# Patient Record
Sex: MALE | Race: WHITE | NOT HISPANIC OR LATINO | URBAN - METROPOLITAN AREA
[De-identification: names, ages, dates, MRNs, and addresses within clinical notes are randomized per-mention and may not be internally consistent; named-entity substitution may affect disease eponyms.]

---

## 2019-06-13 ENCOUNTER — APPOINTMENT (OUTPATIENT)
Dept: URBAN - METROPOLITAN AREA CLINIC 189 | Age: 49
Setting detail: DERMATOLOGY
End: 2019-06-13

## 2019-06-13 DIAGNOSIS — L40.0 PSORIASIS VULGARIS: ICD-10-CM

## 2019-06-13 PROCEDURE — OTHER COUNSELING: OTHER

## 2019-06-13 PROCEDURE — OTHER PRESCRIPTION: OTHER

## 2019-06-13 PROCEDURE — OTHER TREATMENT REGIMEN: OTHER

## 2019-06-13 PROCEDURE — OTHER MIPS QUALITY: OTHER

## 2019-06-13 PROCEDURE — 99213 OFFICE O/P EST LOW 20 MIN: CPT

## 2019-06-13 RX ORDER — HALOBETASOL PROPIONATE 0.1 MG/G
LOTION TOPICAL
Qty: 1 | Refills: 2 | Status: ERX | COMMUNITY
Start: 2019-06-13

## 2019-06-13 RX ORDER — CLOBETASOL PROPIONATE 0.5 MG/ML
SHAMPOO TOPICAL
Qty: 1 | Refills: 3 | Status: ERX | COMMUNITY
Start: 2019-06-13

## 2019-06-13 ASSESSMENT — LOCATION SIMPLE DESCRIPTION DERM
LOCATION SIMPLE: LEFT SHOULDER
LOCATION SIMPLE: HAIR
LOCATION SIMPLE: LEFT THIGH
LOCATION SIMPLE: LEFT UPPER BACK
LOCATION SIMPLE: RIGHT THIGH
LOCATION SIMPLE: RIGHT SHOULDER

## 2019-06-13 ASSESSMENT — LOCATION DETAILED DESCRIPTION DERM
LOCATION DETAILED: LEFT ANTERIOR DISTAL THIGH
LOCATION DETAILED: LEFT SUPERIOR MEDIAL UPPER BACK
LOCATION DETAILED: RIGHT ANTERIOR DISTAL THIGH
LOCATION DETAILED: LEFT ANTERIOR SHOULDER
LOCATION DETAILED: RIGHT ANTERIOR SHOULDER
LOCATION DETAILED: HAIR

## 2019-06-13 ASSESSMENT — PGA PSORIASIS: PGA PSORIASIS: MODERATE (MODERATE PLAQUE ELEVATION, MODERATE ERYTHEMA, COARSE SCALE PREDOMINATES)

## 2019-06-13 ASSESSMENT — LOCATION ZONE DERM
LOCATION ZONE: TRUNK
LOCATION ZONE: ARM
LOCATION ZONE: LEG
LOCATION ZONE: SCALP

## 2019-06-13 ASSESSMENT — BSA PSORIASIS: % BODY COVERED IN PSORIASIS: 5

## 2019-06-13 NOTE — PROCEDURE: TREATMENT REGIMEN
Other Instructions: May mix Clobetasol shampoo with T-gel shampoo if desired: apply daily in shower, allow to sit for 5 minutes, then rinse off
Action 2: Continue
Detail Level: Zone

## 2019-06-26 RX ORDER — HALOBETASOL PROPIONATE 0.1 MG/G
LOTION TOPICAL
Qty: 1 | Refills: 2 | Status: ERX

## 2019-08-05 ENCOUNTER — APPOINTMENT (OUTPATIENT)
Dept: URBAN - METROPOLITAN AREA CLINIC 189 | Age: 49
Setting detail: DERMATOLOGY
End: 2019-08-05

## 2019-08-05 DIAGNOSIS — L40.0 PSORIASIS VULGARIS: ICD-10-CM

## 2019-08-05 PROCEDURE — 99213 OFFICE O/P EST LOW 20 MIN: CPT

## 2019-08-05 PROCEDURE — OTHER MIPS QUALITY: OTHER

## 2019-08-05 PROCEDURE — OTHER COUNSELING: OTHER

## 2019-08-05 PROCEDURE — OTHER TREATMENT REGIMEN: OTHER

## 2019-08-05 ASSESSMENT — LOCATION DETAILED DESCRIPTION DERM
LOCATION DETAILED: RIGHT ANTERIOR DISTAL THIGH
LOCATION DETAILED: LEFT ANTERIOR DISTAL THIGH
LOCATION DETAILED: LEFT ANTERIOR SHOULDER
LOCATION DETAILED: HAIR
LOCATION DETAILED: RIGHT ANTERIOR SHOULDER

## 2019-08-05 ASSESSMENT — LOCATION SIMPLE DESCRIPTION DERM
LOCATION SIMPLE: HAIR
LOCATION SIMPLE: LEFT THIGH
LOCATION SIMPLE: RIGHT THIGH
LOCATION SIMPLE: RIGHT SHOULDER
LOCATION SIMPLE: LEFT SHOULDER

## 2019-08-05 ASSESSMENT — LOCATION ZONE DERM
LOCATION ZONE: SCALP
LOCATION ZONE: ARM
LOCATION ZONE: LEG

## 2019-08-05 ASSESSMENT — BSA PSORIASIS: % BODY COVERED IN PSORIASIS: 3

## 2019-08-05 ASSESSMENT — PGA PSORIASIS: PGA PSORIASIS: MODERATE (MODERATE PLAQUE ELEVATION, MODERATE ERYTHEMA, COARSE SCALE PREDOMINATES)

## 2019-08-05 NOTE — PROCEDURE: TREATMENT REGIMEN
Continue Regimen: Bryhali as previously directed. Recommend applying to the scalp nightly
Action 1: Continue
Otc Regimen: Recommend mixing equal parts Clobetasol shampoo and TSal and shampoo the scalp daily for 2-3 weeks, then hold for 1 week, then repeat cycle if needed
Detail Level: Zone
Other Instructions: Discussed ILK if patient desires
Samples Given: Senri: apply a small amount to the scalp daily for up to 8 weeks, then hold for 1 week. Repeat cycle if needed

## 2021-04-26 ENCOUNTER — RX ONLY (RX ONLY)
Age: 51
End: 2021-04-26

## 2021-04-26 ENCOUNTER — APPOINTMENT (OUTPATIENT)
Dept: URBAN - METROPOLITAN AREA CLINIC 189 | Age: 51
Setting detail: DERMATOLOGY
End: 2021-04-26

## 2021-04-26 DIAGNOSIS — L40.0 PSORIASIS VULGARIS: ICD-10-CM

## 2021-04-26 PROCEDURE — OTHER MIPS QUALITY: OTHER

## 2021-04-26 PROCEDURE — 99213 OFFICE O/P EST LOW 20 MIN: CPT

## 2021-04-26 PROCEDURE — OTHER TREATMENT REGIMEN: OTHER

## 2021-04-26 PROCEDURE — OTHER COUNSELING: OTHER

## 2021-04-26 RX ORDER — CLOBETASOL PROPIONATE 0.05 G/100ML
SHAMPOO TOPICAL
Qty: 1 | Refills: 3 | Status: ERX

## 2021-04-26 RX ORDER — HALOBETASOL PROPIONATE 0.1 MG/G
LOTION TOPICAL
Qty: 1 | Refills: 2 | Status: ERX

## 2021-04-26 RX ORDER — CLOBETASOL PROPIONATE 0.05 G/100ML
SHAMPOO TOPICAL
Qty: 1 | Refills: 3 | Status: ACTIVE

## 2021-04-26 ASSESSMENT — LOCATION ZONE DERM
LOCATION ZONE: SCALP
LOCATION ZONE: ARM
LOCATION ZONE: LEG

## 2021-04-26 ASSESSMENT — PGA PSORIASIS: PGA PSORIASIS 2020: MILD

## 2021-04-26 ASSESSMENT — LOCATION SIMPLE DESCRIPTION DERM
LOCATION SIMPLE: HAIR
LOCATION SIMPLE: RIGHT SHOULDER
LOCATION SIMPLE: RIGHT THIGH
LOCATION SIMPLE: LEFT SHOULDER
LOCATION SIMPLE: LEFT THIGH

## 2021-04-26 ASSESSMENT — LOCATION DETAILED DESCRIPTION DERM
LOCATION DETAILED: RIGHT ANTERIOR DISTAL THIGH
LOCATION DETAILED: LEFT ANTERIOR SHOULDER
LOCATION DETAILED: HAIR
LOCATION DETAILED: RIGHT ANTERIOR SHOULDER
LOCATION DETAILED: LEFT ANTERIOR DISTAL THIGH

## 2021-04-26 ASSESSMENT — BSA PSORIASIS: % BODY COVERED IN PSORIASIS: 1

## 2021-04-26 NOTE — PROCEDURE: TREATMENT REGIMEN
Detail Level: Zone
Action 4: Continue
Continue Regimen: Bryhali as previously directed. Recommend applying to the scalp nightly. Refilled. Rebate card given to patient.\\nClobetasol shampoo: apply to scalp daily for 2-3 weeks, then hold x 1 week, then repeat cycle. Refilled

## 2023-06-07 ENCOUNTER — RX ONLY (RX ONLY)
Age: 53
End: 2023-06-07

## 2023-06-07 ENCOUNTER — APPOINTMENT (OUTPATIENT)
Dept: URBAN - METROPOLITAN AREA CLINIC 189 | Age: 53
Setting detail: DERMATOLOGY
End: 2023-06-08

## 2023-06-07 DIAGNOSIS — L40.0 PSORIASIS VULGARIS: ICD-10-CM

## 2023-06-07 DIAGNOSIS — L82.1 OTHER SEBORRHEIC KERATOSIS: ICD-10-CM

## 2023-06-07 DIAGNOSIS — D485 NEOPLASM OF UNCERTAIN BEHAVIOR OF SKIN: ICD-10-CM

## 2023-06-07 DIAGNOSIS — L72.0 EPIDERMAL CYST: ICD-10-CM

## 2023-06-07 PROBLEM — D48.5 NEOPLASM OF UNCERTAIN BEHAVIOR OF SKIN: Status: ACTIVE | Noted: 2023-06-07

## 2023-06-07 PROCEDURE — OTHER MIPS QUALITY: OTHER

## 2023-06-07 PROCEDURE — OTHER BIOPSY BY SHAVE METHOD: OTHER

## 2023-06-07 PROCEDURE — 99213 OFFICE O/P EST LOW 20 MIN: CPT | Mod: 25

## 2023-06-07 PROCEDURE — 11102 TANGNTL BX SKIN SINGLE LES: CPT

## 2023-06-07 PROCEDURE — OTHER TREATMENT REGIMEN: OTHER

## 2023-06-07 PROCEDURE — OTHER COUNSELING: OTHER

## 2023-06-07 PROCEDURE — OTHER PRESCRIPTION: OTHER

## 2023-06-07 RX ORDER — CLOBETASOL PROPIONATE 0.05 G/100ML
SHAMPOO TOPICAL
Qty: 118 | Refills: 1 | Status: ERX | COMMUNITY
Start: 2023-06-07

## 2023-06-07 RX ORDER — HALOBETASOL PROPIONATE 0.1 MG/G
LOTION TOPICAL
Qty: 60 | Refills: 1 | Status: ERX | COMMUNITY
Start: 2023-06-07

## 2023-06-07 ASSESSMENT — LOCATION DETAILED DESCRIPTION DERM
LOCATION DETAILED: HAIR
LOCATION DETAILED: RIGHT LATERAL INFERIOR EYELID
LOCATION DETAILED: RIGHT MEDIAL SUPERIOR EYELID
LOCATION DETAILED: RIGHT PROXIMAL PRETIBIAL REGION
LOCATION DETAILED: INFERIOR THORACIC SPINE
LOCATION DETAILED: LEFT PROXIMAL PRETIBIAL REGION
LOCATION DETAILED: LEFT MEDIAL EYEBROW
LOCATION DETAILED: LEFT SUPRATARSAL CREASE
LOCATION DETAILED: LEFT MEDIAL MALAR CHEEK
LOCATION DETAILED: RIGHT SUPERIOR UPPER BACK

## 2023-06-07 ASSESSMENT — LOCATION SIMPLE DESCRIPTION DERM
LOCATION SIMPLE: LEFT PRETIBIAL REGION
LOCATION SIMPLE: RIGHT PRETIBIAL REGION
LOCATION SIMPLE: LEFT EYEBROW
LOCATION SIMPLE: LEFT CHEEK
LOCATION SIMPLE: UPPER BACK
LOCATION SIMPLE: RIGHT SUPERIOR EYELID
LOCATION SIMPLE: RIGHT UPPER BACK
LOCATION SIMPLE: LEFT SUPERIOR EYELID
LOCATION SIMPLE: HAIR
LOCATION SIMPLE: RIGHT INFERIOR EYELID

## 2023-06-07 ASSESSMENT — LOCATION ZONE DERM
LOCATION ZONE: EYELID
LOCATION ZONE: FACE
LOCATION ZONE: LEG
LOCATION ZONE: SCALP
LOCATION ZONE: TRUNK

## 2023-06-07 ASSESSMENT — PGA PSORIASIS: PGA PSORIASIS 2020: MODERATE

## 2023-06-07 ASSESSMENT — BSA PSORIASIS: % BODY COVERED IN PSORIASIS: 4

## 2023-06-07 ASSESSMENT — ITCH NUMERIC RATING SCALE: HOW SEVERE IS YOUR ITCHING?: 3

## 2023-06-07 NOTE — PROCEDURE: BIOPSY BY SHAVE METHOD
Body Location Override (Optional - Billing Will Still Be Based On Selected Body Map Location If Applicable): R. Upper back

## 2023-06-07 NOTE — PROCEDURE: TREATMENT REGIMEN
Detail Level: Zone
Action 4: Continue
Continue Regimen: Bryhali as previously directed. Recommend applying to the scalp nightly. Refilled.\\nClobetasol shampoo: apply to scalp daily in shower for 2 weeks, then hold x 1 week, then repeat cycle. Refilled.
Detail Level: Simple
Plan: Extracted 1 lesion on L cheek per patient request with 11 blade and CTAs.  Cosmetic $20.

## 2023-10-05 ENCOUNTER — RX ONLY (RX ONLY)
Age: 53
End: 2023-10-05

## 2023-10-05 ENCOUNTER — APPOINTMENT (OUTPATIENT)
Dept: URBAN - METROPOLITAN AREA CLINIC 189 | Age: 53
Setting detail: DERMATOLOGY
End: 2023-10-05

## 2023-10-05 DIAGNOSIS — D22 MELANOCYTIC NEVI: ICD-10-CM

## 2023-10-05 DIAGNOSIS — L98.8 OTHER SPECIFIED DISORDERS OF THE SKIN AND SUBCUTANEOUS TISSUE: ICD-10-CM

## 2023-10-05 DIAGNOSIS — L81.4 OTHER MELANIN HYPERPIGMENTATION: ICD-10-CM

## 2023-10-05 DIAGNOSIS — Z87.2 PERSONAL HISTORY OF DISEASES OF THE SKIN AND SUBCUTANEOUS TISSUE: ICD-10-CM

## 2023-10-05 DIAGNOSIS — B07.8 OTHER VIRAL WARTS: ICD-10-CM

## 2023-10-05 DIAGNOSIS — I78.8 OTHER DISEASES OF CAPILLARIES: ICD-10-CM

## 2023-10-05 DIAGNOSIS — L40.0 PSORIASIS VULGARIS: ICD-10-CM

## 2023-10-05 DIAGNOSIS — L72.0 EPIDERMAL CYST: ICD-10-CM

## 2023-10-05 PROBLEM — D22.5 MELANOCYTIC NEVI OF TRUNK: Status: ACTIVE | Noted: 2023-10-05

## 2023-10-05 PROBLEM — D22.71 MELANOCYTIC NEVI OF RIGHT LOWER LIMB, INCLUDING HIP: Status: ACTIVE | Noted: 2023-10-05

## 2023-10-05 PROBLEM — D22.61 MELANOCYTIC NEVI OF RIGHT UPPER LIMB, INCLUDING SHOULDER: Status: ACTIVE | Noted: 2023-10-05

## 2023-10-05 PROBLEM — D22.62 MELANOCYTIC NEVI OF LEFT UPPER LIMB, INCLUDING SHOULDER: Status: ACTIVE | Noted: 2023-10-05

## 2023-10-05 PROBLEM — D22.72 MELANOCYTIC NEVI OF LEFT LOWER LIMB, INCLUDING HIP: Status: ACTIVE | Noted: 2023-10-05

## 2023-10-05 PROCEDURE — OTHER TREATMENT REGIMEN: OTHER

## 2023-10-05 PROCEDURE — OTHER OBSERVATION: OTHER

## 2023-10-05 PROCEDURE — 99213 OFFICE O/P EST LOW 20 MIN: CPT

## 2023-10-05 PROCEDURE — OTHER MIPS QUALITY: OTHER

## 2023-10-05 PROCEDURE — OTHER PRESCRIPTION: OTHER

## 2023-10-05 PROCEDURE — OTHER COUNSELING: OTHER

## 2023-10-05 RX ORDER — CLOBETASOL PROPIONATE 0.05 G/100ML
SHAMPOO TOPICAL
Qty: 118 | Refills: 1 | Status: ERX

## 2023-10-05 RX ORDER — HALOBETASOL PROPIONATE 0.1 MG/G
LOTION TOPICAL
Qty: 60 | Refills: 1 | Status: ERX

## 2023-10-05 RX ORDER — CLOBETASOL PROPIONATE 0.05 G/100ML
SHAMPOO TOPICAL
Qty: 118 | Refills: 1 | Status: CANCELLED

## 2023-10-05 ASSESSMENT — LOCATION DETAILED DESCRIPTION DERM
LOCATION DETAILED: RIGHT CENTRAL MALAR CHEEK
LOCATION DETAILED: RIGHT POSTERIOR SHOULDER
LOCATION DETAILED: RIGHT MEDIAL SUPERIOR EYELID
LOCATION DETAILED: LEFT MEDIAL EYEBROW
LOCATION DETAILED: LEFT MEDIAL UPPER BACK
LOCATION DETAILED: RIGHT DISTAL POSTERIOR THIGH
LOCATION DETAILED: RIGHT LATERAL INFERIOR EYELID
LOCATION DETAILED: LEFT PROXIMAL POSTERIOR UPPER ARM
LOCATION DETAILED: HAIR
LOCATION DETAILED: SUPERIOR THORACIC SPINE
LOCATION DETAILED: RIGHT PROXIMAL PRETIBIAL REGION
LOCATION DETAILED: LEFT MEDIAL INFERIOR CHEST
LOCATION DETAILED: RIGHT BUTTOCK
LOCATION DETAILED: LEFT BUTTOCK
LOCATION DETAILED: RIGHT SUPERIOR HELIX
LOCATION DETAILED: LEFT PROXIMAL PRETIBIAL REGION
LOCATION DETAILED: RIGHT PROXIMAL POSTERIOR UPPER ARM
LOCATION DETAILED: LEFT DISTAL POSTERIOR THIGH
LOCATION DETAILED: LEFT INFERIOR VERMILION LIP
LOCATION DETAILED: LEFT SUPERIOR CRUS OF ANTIHELIX
LOCATION DETAILED: LEFT MEDIAL CANTHUS
LOCATION DETAILED: INFERIOR THORACIC SPINE
LOCATION DETAILED: LEFT MEDIAL MALAR CHEEK

## 2023-10-05 ASSESSMENT — LOCATION ZONE DERM
LOCATION ZONE: ARM
LOCATION ZONE: SCALP
LOCATION ZONE: LIP
LOCATION ZONE: LEG
LOCATION ZONE: TRUNK
LOCATION ZONE: EYELID
LOCATION ZONE: FACE
LOCATION ZONE: EAR

## 2023-10-05 ASSESSMENT — LOCATION SIMPLE DESCRIPTION DERM
LOCATION SIMPLE: RIGHT SUPERIOR EYELID
LOCATION SIMPLE: UPPER BACK
LOCATION SIMPLE: LEFT UPPER BACK
LOCATION SIMPLE: LEFT UPPER ARM
LOCATION SIMPLE: RIGHT INFERIOR EYELID
LOCATION SIMPLE: RIGHT CHEEK
LOCATION SIMPLE: LEFT EYELID
LOCATION SIMPLE: LEFT BUTTOCK
LOCATION SIMPLE: RIGHT EAR
LOCATION SIMPLE: LEFT LIP
LOCATION SIMPLE: LEFT EAR
LOCATION SIMPLE: LEFT EYEBROW
LOCATION SIMPLE: HAIR
LOCATION SIMPLE: RIGHT SHOULDER
LOCATION SIMPLE: RIGHT BUTTOCK
LOCATION SIMPLE: RIGHT PRETIBIAL REGION
LOCATION SIMPLE: CHEST
LOCATION SIMPLE: RIGHT POSTERIOR THIGH
LOCATION SIMPLE: RIGHT UPPER ARM
LOCATION SIMPLE: LEFT PRETIBIAL REGION
LOCATION SIMPLE: LEFT POSTERIOR THIGH
LOCATION SIMPLE: LEFT CHEEK

## 2023-10-05 ASSESSMENT — ITCH NUMERIC RATING SCALE: HOW SEVERE IS YOUR ITCHING?: 3

## 2023-10-05 ASSESSMENT — BSA PSORIASIS: % BODY COVERED IN PSORIASIS: 2

## 2023-10-05 NOTE — PROCEDURE: TREATMENT REGIMEN
Continue Regimen: Bryhali as previously directed. Recommend applying to the scalp nightly. Refilled.\\nClobetasol shampoo: apply to scalp daily in shower for 2 weeks, then hold x 1 week, then repeat cycle. Refilled.

## 2023-10-05 NOTE — PROCEDURE: TREATMENT REGIMEN
Plan: Discussed Skyrizi- pamphlet provided. Will discuss at follow up in 4-6 weeks Other Instructions: Discussed Skyrizi- pamphlet provided. Will discuss at follow up in 4-6 weeks

## 2023-10-05 NOTE — PROCEDURE: OBSERVATION
Body Location Override (Optional - Billing Will Still Be Based On Selected Body Map Location If Applicable): R upper back
Detail Level: Detailed
Size Of Lesion In Cm (Optional): 0

## 2023-11-15 ENCOUNTER — APPOINTMENT (OUTPATIENT)
Dept: URBAN - METROPOLITAN AREA CLINIC 189 | Age: 53
Setting detail: DERMATOLOGY
End: 2023-11-15

## 2023-11-15 DIAGNOSIS — L72.0 EPIDERMAL CYST: ICD-10-CM

## 2023-11-15 DIAGNOSIS — B07.8 OTHER VIRAL WARTS: ICD-10-CM

## 2023-11-15 PROCEDURE — OTHER MIPS QUALITY: OTHER

## 2023-11-15 PROCEDURE — OTHER LIQUID NITROGEN: OTHER

## 2023-11-15 PROCEDURE — 17110 DESTRUCT B9 LESION 1-14: CPT

## 2023-11-15 PROCEDURE — OTHER COUNSELING: OTHER

## 2023-11-15 PROCEDURE — OTHER BENIGN DESTRUCTION COSMETIC MULTI: OTHER

## 2023-11-15 ASSESSMENT — LOCATION DETAILED DESCRIPTION DERM
LOCATION DETAILED: RIGHT LATERAL SUPERIOR EYELID
LOCATION DETAILED: LEFT MEDIAL SUPERIOR EYELID
LOCATION DETAILED: LEFT LATERAL SUPERIOR EYELID

## 2023-11-15 ASSESSMENT — LOCATION SIMPLE DESCRIPTION DERM
LOCATION SIMPLE: RIGHT SUPERIOR EYELID
LOCATION SIMPLE: LEFT SUPERIOR EYELID

## 2023-11-15 ASSESSMENT — LOCATION ZONE DERM: LOCATION ZONE: EYELID

## 2023-11-15 NOTE — PROCEDURE: LIQUID NITROGEN
Medical Necessity Clause: This procedure was medically necessary because the lesions that were treated were:
Add 52 Modifier (Optional): no
Medical Necessity Information: It is in your best interest to select a reason for this procedure from the list below. All of these items fulfill various CMS LCD requirements except the new and changing color options.
Duration Of Freeze Thaw-Cycle (Seconds): 0
Number Of Freeze-Thaw Cycles: 1 freeze-thaw cycle
Show Spray Paint Technique Variable?: Yes
Spray Paint Text: The liquid nitrogen was applied to the skin utilizing a spray paint frosting technique.
Total Number Of Lesions Treated: 10
Post-Care Instructions: I reviewed with the patient in detail post-care instructions. Patient is to wear sunprotection, and avoid picking at any of the treated lesions. Pt may apply Vaseline to crusted or scabbing areas.
Consent: The patient's consent was obtained including but not limited to risks of crusting, scabbing, blistering, scarring, darker or lighter pigmentary change, recurrence, incomplete removal and infection.
Detail Level: Simple

## 2023-11-15 NOTE — PROCEDURE: BENIGN DESTRUCTION COSMETIC MULTI
Total Number Of Lesions Treated: 2
Detail Level: Simple
Anesthesia Volume In Cc: 0.5
Anesthesia Type: 1% lidocaine with epinephrine and a 1:10 solution of 8.4% sodium bicarbonate
Post-Care Instructions: I reviewed with the patient in detail post-care instructions. Patient is to wear sunprotection, and avoid picking at any of the treated lesions. Pt may apply Vaseline to crusted or scabbing areas.
Consent: The patient's consent was obtained including but not limited to risks of crusting, scabbing, blistering, scarring, darker or lighter pigmentary change, recurrence, incomplete removal and infection.

## 2023-12-14 ENCOUNTER — APPOINTMENT (OUTPATIENT)
Dept: URBAN - METROPOLITAN AREA CLINIC 189 | Age: 53
Setting detail: DERMATOLOGY
End: 2023-12-14

## 2023-12-14 DIAGNOSIS — D485 NEOPLASM OF UNCERTAIN BEHAVIOR OF SKIN: ICD-10-CM

## 2023-12-14 PROBLEM — D48.5 NEOPLASM OF UNCERTAIN BEHAVIOR OF SKIN: Status: ACTIVE | Noted: 2023-12-14

## 2023-12-14 PROCEDURE — OTHER MIPS QUALITY: OTHER

## 2023-12-14 PROCEDURE — OTHER BIOPSY BY SHAVE METHOD: OTHER

## 2023-12-14 PROCEDURE — OTHER COUNSELING: OTHER

## 2023-12-14 PROCEDURE — 11102 TANGNTL BX SKIN SINGLE LES: CPT

## 2023-12-14 PROCEDURE — 11103 TANGNTL BX SKIN EA SEP/ADDL: CPT

## 2023-12-14 ASSESSMENT — LOCATION SIMPLE DESCRIPTION DERM
LOCATION SIMPLE: LEFT SUPERIOR EYELID
LOCATION SIMPLE: RIGHT SUPERIOR EYELID
LOCATION SIMPLE: RIGHT SHOULDER

## 2023-12-14 ASSESSMENT — LOCATION DETAILED DESCRIPTION DERM
LOCATION DETAILED: LEFT MEDIAL SUPERIOR EYELID
LOCATION DETAILED: RIGHT POSTERIOR SHOULDER
LOCATION DETAILED: RIGHT LATERAL SUPERIOR EYELID

## 2023-12-14 ASSESSMENT — LOCATION ZONE DERM
LOCATION ZONE: EYELID
LOCATION ZONE: ARM

## 2023-12-14 NOTE — HPI: SECONDARY COMPLAINT
How Severe Is This Condition?: mild
Additional History: Patient admits trauma due to itching/picking.

## 2023-12-14 NOTE — PROCEDURE: BIOPSY BY SHAVE METHOD
Body Location Override (Optional - Billing Will Still Be Based On Selected Body Map Location If Applicable): right upper back
Detail Level: Detailed
Depth Of Biopsy: dermis
Was A Bandage Applied: Yes
Size Of Lesion In Cm: 0.6
X Size Of Lesion In Cm: 0
Biopsy Type: H and E
Biopsy Method: Dermablade
Anesthesia Type: 1% lidocaine with epinephrine and a 1:10 solution of 8.4% sodium bicarbonate
Anesthesia Volume In Cc: 0.5
Hemostasis: Aluminum Chloride and Electrocautery
Wound Care: Vaseline
Dressing: bandage
Destruction After The Procedure: No
Type Of Destruction Used: Curettage
Curettage Text: The wound bed was treated with curettage after the biopsy was performed.
Cryotherapy Text: The wound bed was treated with cryotherapy after the biopsy was performed.
Electrodesiccation Text: The wound bed was treated with electrodesiccation after the biopsy was performed.
Electrodesiccation And Curettage Text: The wound bed was treated with electrodesiccation and curettage after the biopsy was performed.
Silver Nitrate Text: The wound bed was treated with silver nitrate after the biopsy was performed.
Lab: [id number]
Lab Facility: [id number]
Consent: Written consent was obtained and risks were reviewed including but not limited to scarring, infection, bleeding, scabbing, incomplete removal, nerve damage and allergy to anesthesia.
Post-Care Instructions: I reviewed with the patient in detail post-care instructions. Patient is to keep the biopsy site dry overnight, and then apply bacitracin twice daily until healed. Patient may apply hydrogen peroxide soaks to remove any crusting.
Notification Instructions: Patient will be notified of biopsy results. However, patient instructed to call the office if not contacted within 2 weeks.
Billing Type: Third-Party Bill
Information: Selecting Yes will display possible errors in your note based on the variables you have selected. This validation is only offered as a suggestion for you. PLEASE NOTE THAT THE VALIDATION TEXT WILL BE REMOVED WHEN YOU FINALIZE YOUR NOTE. IF YOU WANT TO FAX A PRELIMINARY NOTE YOU WILL NEED TO TOGGLE THIS TO 'NO' IF YOU DO NOT WANT IT IN YOUR FAXED NOTE.
Body Location Override (Optional - Billing Will Still Be Based On Selected Body Map Location If Applicable): right upper eyelid
Size Of Lesion In Cm: 0.2

## 2024-01-25 ENCOUNTER — APPOINTMENT (OUTPATIENT)
Dept: URBAN - METROPOLITAN AREA CLINIC 189 | Age: 54
Setting detail: DERMATOLOGY
End: 2024-01-25

## 2024-01-25 ENCOUNTER — RX ONLY (RX ONLY)
Age: 54
End: 2024-01-25

## 2024-01-25 DIAGNOSIS — L57.0 ACTINIC KERATOSIS: ICD-10-CM

## 2024-01-25 DIAGNOSIS — L30.0 NUMMULAR DERMATITIS: ICD-10-CM

## 2024-01-25 PROCEDURE — OTHER MIPS QUALITY: OTHER

## 2024-01-25 PROCEDURE — OTHER COUNSELING: OTHER

## 2024-01-25 PROCEDURE — 99213 OFFICE O/P EST LOW 20 MIN: CPT

## 2024-01-25 PROCEDURE — OTHER TREATMENT REGIMEN: OTHER

## 2024-01-25 RX ORDER — CLOBETASOL PROPIONATE 0.05 G/100ML
SHAMPOO TOPICAL
Qty: 118 | Refills: 1 | Status: ERX

## 2024-01-25 RX ORDER — HALOBETASOL PROPIONATE 0.1 MG/G
LOTION TOPICAL
Qty: 60 | Refills: 1 | Status: ERX

## 2024-01-25 ASSESSMENT — LOCATION SIMPLE DESCRIPTION DERM
LOCATION SIMPLE: LEFT PRETIBIAL REGION
LOCATION SIMPLE: RIGHT UPPER BACK
LOCATION SIMPLE: RIGHT PRETIBIAL REGION

## 2024-01-25 ASSESSMENT — SEVERITY ASSESSMENT: SEVERITY: MODERATE

## 2024-01-25 ASSESSMENT — ITCH NUMERIC RATING SCALE: HOW SEVERE IS YOUR ITCHING?: 7

## 2024-01-25 ASSESSMENT — LOCATION ZONE DERM
LOCATION ZONE: LEG
LOCATION ZONE: TRUNK

## 2024-01-25 ASSESSMENT — LOCATION DETAILED DESCRIPTION DERM
LOCATION DETAILED: LEFT PROXIMAL PRETIBIAL REGION
LOCATION DETAILED: RIGHT SUPERIOR UPPER BACK
LOCATION DETAILED: RIGHT PROXIMAL PRETIBIAL REGION

## 2024-01-25 NOTE — PROCEDURE: TREATMENT REGIMEN
Detail Level: Simple
Initiate Treatment: May use Bryhali : apply to areas on leg daily x 8 weeks, then hold x 1 week. Repeat cycle if needed. Do not apply to face, groin, or underarms
Otc Regimen: Recommend cleansing with a gentle cleanser such as Dove or Oil of Olay.\\nRecommend moisturizing 1-2 times a day with Cetaphil, CeraVe, Aveeno, or Vaseline brand lotions

## 2024-06-20 ENCOUNTER — RX ONLY (RX ONLY)
Age: 54
End: 2024-06-20

## 2024-06-20 RX ORDER — CLOBETASOL PROPIONATE 0.05 G/100ML
SHAMPOO TOPICAL
Qty: 118 | Refills: 1 | Status: ERX